# Patient Record
Sex: MALE
[De-identification: names, ages, dates, MRNs, and addresses within clinical notes are randomized per-mention and may not be internally consistent; named-entity substitution may affect disease eponyms.]

---

## 2024-01-30 ENCOUNTER — DASHBOARD ENCOUNTERS (OUTPATIENT)
Age: 69
End: 2024-01-30

## 2024-02-08 ENCOUNTER — OV NP (OUTPATIENT)
Dept: URBAN - METROPOLITAN AREA CLINIC 105 | Facility: CLINIC | Age: 69
End: 2024-02-08

## 2025-03-14 ENCOUNTER — OFFICE VISIT (OUTPATIENT)
Dept: URBAN - METROPOLITAN AREA CLINIC 105 | Facility: CLINIC | Age: 70
End: 2025-03-14

## 2025-03-14 NOTE — HPI-TODAY'S VISIT:
69-year-old male with PMH of ESRD on dialysis, type 2 diabetes, and renovascular HTN, presenting to discuss abdominal pain. Per records, he has been evaluated for renal transplant at [facility]. He had colonoscopy 6/2023 with poor prep and use of Plavix up until day prior to procedure; multiple polyps identified but not removed d/t prep and risk of bleeding. He was to repeat colonoscopy at next available appointment, but no evidence this was done.  Colonoscopy 6/27/2023: Fair to poor prep, at least 5 polyps identified but not removed d/t recent use of Plavix. Repeat ASAP.  Randy

## 2025-04-30 ENCOUNTER — OFFICE VISIT (OUTPATIENT)
Dept: URBAN - METROPOLITAN AREA CLINIC 17 | Facility: CLINIC | Age: 70
End: 2025-04-30